# Patient Record
Sex: FEMALE | Race: WHITE | HISPANIC OR LATINO | Employment: OTHER | URBAN - METROPOLITAN AREA
[De-identification: names, ages, dates, MRNs, and addresses within clinical notes are randomized per-mention and may not be internally consistent; named-entity substitution may affect disease eponyms.]

---

## 2022-10-31 ENCOUNTER — OFFICE VISIT (OUTPATIENT)
Dept: FAMILY MEDICINE | Facility: HOSPITAL | Age: 72
End: 2022-10-31

## 2022-10-31 VITALS
DIASTOLIC BLOOD PRESSURE: 66 MMHG | HEART RATE: 73 BPM | HEIGHT: 62 IN | SYSTOLIC BLOOD PRESSURE: 113 MMHG | WEIGHT: 142.63 LBS | BODY MASS INDEX: 26.25 KG/M2

## 2022-10-31 DIAGNOSIS — G89.29 CHRONIC LEFT SHOULDER PAIN: ICD-10-CM

## 2022-10-31 DIAGNOSIS — Z76.89 ENCOUNTER TO ESTABLISH CARE WITH NEW DOCTOR: Primary | ICD-10-CM

## 2022-10-31 DIAGNOSIS — R73.03 PREDIABETES: ICD-10-CM

## 2022-10-31 DIAGNOSIS — M25.512 CHRONIC LEFT SHOULDER PAIN: ICD-10-CM

## 2022-10-31 DIAGNOSIS — M81.0 AGE-RELATED OSTEOPOROSIS WITHOUT CURRENT PATHOLOGICAL FRACTURE: ICD-10-CM

## 2022-10-31 PROCEDURE — 99204 OFFICE O/P NEW MOD 45 MIN: CPT

## 2022-10-31 RX ORDER — METFORMIN HYDROCHLORIDE 1000 MG/1
1000 TABLET ORAL
COMMUNITY
Start: 2022-09-27 | End: 2023-09-27

## 2022-10-31 RX ORDER — NAPROXEN 500 MG/1
500 TABLET ORAL 2 TIMES DAILY PRN
COMMUNITY
Start: 2022-09-27

## 2022-10-31 NOTE — PROGRESS NOTES
"History & Physical  \[facility]\"" FAMILY PRACTICE      SUBJECTIVE:     History of Present Illness:  Patient is a 72 y.o. Sami-speaking female with past medical history of prediabetes and osteoporosis who presents to clinic to establish care as a new patient. She previously saw PCP in [place] and does not have her medical records at this visit. Here with son in law.    She is complaining of bilateral shoulder pain (Left > Right). Right shoulder pain occurred 1 year ago and now she is having Left shoulder pain. She was recently seen at [facility] ED on 9/12/22 for Left shoulder pain. She was d/c on naproxen, which she has last used 3 weeks ago as she has not been in much pain. Pt denies numbness/tingling. She reports that cooking and physical activity involving her shoulder aggravates it. She received a steroid injection in [place] for shoulder pain 8 months ago. She would be interested in physical therapy, but cost is an issue as she lacks insurance.    DEXA scan was done in [place] and she does not have specific information at this time about the results. She believes she was meant to start an injectable medication for osteoporosis, likely bisphosphonate, which he is currently not on. Recommended OTC calcium and vit D supplements to pt.      Last mammogram - DUE    Due for flu shot and Pneumovax, deferred to next visit    Last colonoscopy - done 3 months ago in [place] showing internal hemorrhoids, no blood in stool.    Pt states she has had painless, intermittent hematuria for the past 6-8 months.    Non-smoker    No EtOH or drug use      Review of patient's allergies indicates:  No Known Allergies    No past medical history on file.  No past surgical history on file.  No family history on file.        OBJECTIVE:     Vital Signs (Most Recent)  Vitals:    10/31/22 1326   BP: 113/66   Pulse: 73   Weight: 64.7 kg (142 lb 10.2 oz)   Height: 5' 2" (1.575 m)     BMI: 26    Physical Exam:  Gen: Well nourished and developed, NAD, " appears stated age  HEENT: normocephalic, atraumatic, PERRLA, no nasal septal deviation, oropharynx mucosa pink and moist without tonsillar exudates  Neck: trachea midline, no LAD, no thyromegaly  CV: RRR, S1 and S2 present, no murmurs, no LE edema, radial and dorsalis pedis pulses equal and present bilaterally  Resp: breathing comfortably on room air, CTAB, no wheezes or crackles  Abd: Non-distended, BSx4, non-tender  Skin: No rashes or abnormal skin lesions, no apparent jaundice or bruising  Neuro: A&Ox4, sensation intact throughout, spontaneous movements, gait smooth mart wnl, UE and LE strength 5/5 bilaterally, patellar and biceps DTRs 2+/4 bilaterally  MSK: Full ROM of all extremities, mild tenderness to palpation of Left shoulder, no joint edema  Psych: Logical thought process, answers questions appropriately    Laboratory  I have reviewed all pertinent lab results within the past 24 hours.      Diagnostic Results:  Labs: Reviewed      ASSESSMENT/PLAN:   72 y.o.female presents to clinic to establish care.     Encounter to establish care with new doctor  - Need to find out if pt can get lab work via alternative free/low cost way as she is self pay. Will follow up.  - Asked pt if she can obtain medical records from [place]  - Plan to order UA, mammogram referral, HbA1c, CBC, lipid panel when able      Pt states she is going to [place] in 2 weeks.    Follow-up: 1 week    A total of 45 minutes was spent on patient care during this encounter which included chart review, examining the patient, formulating a treatment plan and documentation.     Case discussed with staff: Dr. Martell.      Sarah Ludwig MD, MPH  [facility] Medicine PGY-1  10/31/2022

## 2022-11-03 PROBLEM — M25.512 CHRONIC LEFT SHOULDER PAIN: Status: ACTIVE | Noted: 2022-11-03

## 2022-11-03 PROBLEM — G89.29 CHRONIC LEFT SHOULDER PAIN: Status: ACTIVE | Noted: 2022-11-03

## 2022-11-03 PROBLEM — R73.03 PREDIABETES: Status: ACTIVE | Noted: 2022-11-03

## 2022-11-03 PROBLEM — M81.0 AGE-RELATED OSTEOPOROSIS WITHOUT CURRENT PATHOLOGICAL FRACTURE: Status: ACTIVE | Noted: 2022-11-03
